# Patient Record
Sex: FEMALE | Race: AMERICAN INDIAN OR ALASKA NATIVE | URBAN - METROPOLITAN AREA
[De-identification: names, ages, dates, MRNs, and addresses within clinical notes are randomized per-mention and may not be internally consistent; named-entity substitution may affect disease eponyms.]

---

## 2023-02-28 ENCOUNTER — TELEPHONE (OUTPATIENT)
Dept: RADIOLOGY | Facility: CLINIC | Age: 58
End: 2023-02-28
Payer: COMMERCIAL

## 2023-02-28 NOTE — TELEPHONE ENCOUNTER
I called and spoke with Shila.  She has been having crotch pain for the last 10 years during her periods/ovulation time. She had a recent trip to [facility], after long flight she developed constant intense same crotch pain.  She had US at [facility], showed dilated veins.  She said that she has a hx of bad contrast allergy to IV contrast where she was hospitalized for two weeks, she was told never to get IV contrast again.  Pt is also refusing MRi contrast, even though this was explained not iodine contrast.  Plan is to review with one of IR providers to see if there is imaging (non contrast) that can be done to help with diagnosis and/or treatment.  Will update patient once plan is developed.  I have discussed with Kim MATOS to take over.  CLARA Aguero RN, BSN  Interventional Radiology Nurse Coordinator   Phone:  [phone]